# Patient Record
(demographics unavailable — no encounter records)

---

## 2024-11-19 NOTE — HISTORY OF PRESENT ILLNESS
[FreeTextEntry1] : FBSE  [de-identified] : Malgorzata Ramos 36 yo F presents for FBSE  Patient is 8 months pregnant cyst on suprapubic area - had excised, looks like it opened up. Asx Personal history of skin cancer: no Family history of skin cancer: yes, maternal grandfather - melanoma History of blistering sunburns: yes History of tanning bed use: no Uses sunscreen regularly: yes Patient/Caregiver provided printed discharge information.

## 2024-11-19 NOTE — PHYSICAL EXAM
[Alert] : alert [Oriented x 3] : ~L oriented x 3 [Full Body Skin Exam Performed] : performed [FreeTextEntry3] : PE:   General: well-appearing, alert, in no acute distress  Full body skin exam performed examining scalp, head, face, ears, eyes, mouth, neck, chest, back, abdomen, axilla, b/l arms, b/l forearms, b/l hands, b/l fingernails, b/l thighs, b/l legs, b/l feet, b/l toenails, groin, buttocks Pertinent findings include: -scattered light brown to dark brown colored <6mm papules and macules on the trunk and extremities -brown stuck on papules, plaques on the trunk and extremities -scar on suprapubic groin

## 2025-07-09 NOTE — HISTORY OF PRESENT ILLNESS
[FreeTextEntry1] : follow up meds  [de-identified] : 35 yo f presents for follow up  was on zepbound  interested in increase dose

## 2025-07-09 NOTE — PLAN
[FreeTextEntry1] : obesity  increased zepbound from 5 mg to 7.5 mg  cont meds  encouraged healthy lifestyles  reviewed risks benefits side effects alternatives regimen   reviewed previous labs, elevated lfts, repeat at visit in Nov.  slight elevation to ldl, discussed healthy lifestyles  normal a1c, tsh